# Patient Record
Sex: FEMALE | Race: BLACK OR AFRICAN AMERICAN | Employment: STUDENT | URBAN - METROPOLITAN AREA
[De-identification: names, ages, dates, MRNs, and addresses within clinical notes are randomized per-mention and may not be internally consistent; named-entity substitution may affect disease eponyms.]

---
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
## 2025-06-16 ENCOUNTER — OFFICE VISIT (OUTPATIENT)
Dept: FAMILY MEDICINE CLINIC | Age: 20
End: 2025-06-16
Payer: COMMERCIAL

## 2025-06-16 VITALS
BODY MASS INDEX: 23.32 KG/M2 | DIASTOLIC BLOOD PRESSURE: 72 MMHG | WEIGHT: 140 LBS | SYSTOLIC BLOOD PRESSURE: 110 MMHG | HEIGHT: 65 IN | OXYGEN SATURATION: 98 % | HEART RATE: 63 BPM

## 2025-06-16 DIAGNOSIS — Z11.3 SCREEN FOR STD (SEXUALLY TRANSMITTED DISEASE): ICD-10-CM

## 2025-06-16 DIAGNOSIS — Z00.00 ROUTINE ADULT HEALTH MAINTENANCE: ICD-10-CM

## 2025-06-16 DIAGNOSIS — L20.84 INTRINSIC ECZEMA: Primary | ICD-10-CM

## 2025-06-16 PROCEDURE — 99214 OFFICE O/P EST MOD 30 MIN: CPT | Performed by: STUDENT IN AN ORGANIZED HEALTH CARE EDUCATION/TRAINING PROGRAM

## 2025-06-16 RX ORDER — CLOBETASOL PROPIONATE 0.5 MG/G
OINTMENT TOPICAL
Qty: 15 G | Refills: 1 | Status: SHIPPED | OUTPATIENT
Start: 2025-06-16

## 2025-06-16 SDOH — ECONOMIC STABILITY: FOOD INSECURITY: WITHIN THE PAST 12 MONTHS, THE FOOD YOU BOUGHT JUST DIDN'T LAST AND YOU DIDN'T HAVE MONEY TO GET MORE.: NEVER TRUE

## 2025-06-16 SDOH — ECONOMIC STABILITY: FOOD INSECURITY: WITHIN THE PAST 12 MONTHS, YOU WORRIED THAT YOUR FOOD WOULD RUN OUT BEFORE YOU GOT MONEY TO BUY MORE.: NEVER TRUE

## 2025-06-16 ASSESSMENT — ENCOUNTER SYMPTOMS
CHEST TIGHTNESS: 0
NAUSEA: 0
COUGH: 0
ABDOMINAL PAIN: 0
SHORTNESS OF BREATH: 0
DIARRHEA: 0
WHEEZING: 0
VOMITING: 0
EYE DISCHARGE: 0
CONSTIPATION: 0
SORE THROAT: 0

## 2025-06-16 ASSESSMENT — PATIENT HEALTH QUESTIONNAIRE - PHQ9
SUM OF ALL RESPONSES TO PHQ QUESTIONS 1-9: 0
1. LITTLE INTEREST OR PLEASURE IN DOING THINGS: NOT AT ALL
2. FEELING DOWN, DEPRESSED OR HOPELESS: NOT AT ALL
SUM OF ALL RESPONSES TO PHQ QUESTIONS 1-9: 0

## 2025-06-16 NOTE — PROGRESS NOTES
[facility]  [facility] PRIMARY [address]  [address]  [facility] [place] [address]  Dept: [phone]  Dept Fax: [phone]    Campos Jorgensen is a 19 y.o. female who is a Established patient, who presents today for her medical conditions/complaints as noted below:  Chief Complaint   Patient presents with    allergy testing. Had a reaction to something.    Depression         HPI:     HPI  History of Present Illness  The patient presents for evaluation of a rash.    She reports a known allergy to dust but is uncertain if her current symptoms are due to stress or external heat. She has been experiencing flare-ups on her neck and arm, which she has been attempting to alleviate through scratching. She does not report any food allergies or weather-related triggers but suspects that excessive dairy intake or added sugars may be contributing factors. She also notes that her symptoms seem to worsen with stress, poor diet, and heat. She has been using Silvadene cream for the past 2 days, which has provided some relief. She has exhausted her supply of clobetasol 0.5% cream, which she found beneficial in the past.    She is sexually active and has never undergone a Pap smear. She does not currently have an OB-GYN and is interested in getting a referral.    She experienced a depressive episode 2 days ago following the death of a friend but reports no ongoing feelings of depression or lack of motivation. Her sleep pattern is generally good, although she had difficulty sleeping 2 days ago. She is not currently seeing a therapist and does not feel the need to do so at this time.    She reports no current migraines or feelings of fatigue.    SOCIAL HISTORY  She works at a long-term care facility.        No results found for: \"LABA1C\"          ( goal A1Cis < 7)   No components found for: \"LABMICR\"  No components found for: \"LDLCHOLESTEROL\", \"LDLCALC\"    (goal LDL is <100)   No results found for: \"AST\", \"ALT\", \"BUN\",

## 2025-07-31 ENCOUNTER — OFFICE VISIT (OUTPATIENT)
Age: 20
End: 2025-07-31

## 2025-07-31 VITALS
OXYGEN SATURATION: 98 % | TEMPERATURE: 97.2 F | DIASTOLIC BLOOD PRESSURE: 73 MMHG | BODY MASS INDEX: 23.32 KG/M2 | HEIGHT: 65 IN | SYSTOLIC BLOOD PRESSURE: 110 MMHG | WEIGHT: 140 LBS | RESPIRATION RATE: 20 BRPM | HEART RATE: 81 BPM

## 2025-07-31 DIAGNOSIS — J02.9 ACUTE PHARYNGITIS, UNSPECIFIED ETIOLOGY: Primary | ICD-10-CM

## 2025-07-31 PROBLEM — S06.0X9A CONCUSSION W LOSS OF CONSCIOUSNESS OF UNSP DURATION, INIT: Status: ACTIVE | Noted: 2022-10-19

## 2025-07-31 LAB — S PYO AG THROAT QL: NORMAL

## 2025-07-31 ASSESSMENT — ENCOUNTER SYMPTOMS
COUGH: 0
SORE THROAT: 1
RHINORRHEA: 0
SHORTNESS OF BREATH: 0
NAUSEA: 0

## 2025-07-31 NOTE — PROGRESS NOTES
[facility], [facility] ([place])  [facility] [place] [id number]  Dept: [phone]    Date: 25    Visit date not found      Campos Jorgensen (:  2005) is a 19 y.o. female, here for evaluation of the following chief complaint(s):  Cold Symptoms (Patient said her symptoms started today )      HPI    History provided by:  Patient  Cold Symptoms   This is a new problem. The current episode started today. The problem has been unchanged. There has been no fever. Associated symptoms include ear pain and a sore throat. Pertinent negatives include no chest pain, congestion, coughing, headaches, nausea, rash or rhinorrhea. She has tried nothing for the symptoms.          History reviewed. No pertinent past medical history.      ROS  Review of Systems   Constitutional:  Negative for fatigue and fever.   HENT:  Positive for ear pain and sore throat. Negative for congestion and rhinorrhea.    Respiratory:  Negative for cough and shortness of breath.    Cardiovascular:  Negative for chest pain and palpitations.   Gastrointestinal:  Negative for nausea.   Skin:  Negative for rash.   Neurological:  Positive for dizziness. Negative for headaches.       PHYSICAL EXAM  Vitals:    25 1754   BP: 110/73   BP Site: Right Upper Arm   Patient Position: Sitting   BP Cuff Size: Medium Adult   Pulse: 81   Resp: 20   Temp: 97.2 °F (36.2 °C)   SpO2: 98%   Weight: 63.5 kg (140 lb)   Height: 1.651 m (5' 5\")     Physical Exam  Constitutional:       General: She is not in acute distress.     Appearance: Normal appearance.   HENT:      Head: Normocephalic.      Right Ear: Tympanic membrane normal.      Left Ear: Tympanic membrane normal.      Nose: Nose normal.      Mouth/Throat:      Pharynx: No posterior oropharyngeal erythema.   Eyes:      Extraocular Movements: Extraocular movements intact.   Cardiovascular:      Rate and Rhythm: Normal rate.   Pulmonary:      Effort: Pulmonary

## 2025-09-02 ENCOUNTER — OFFICE VISIT (OUTPATIENT)
Age: 20
End: 2025-09-02

## 2025-09-02 VITALS — OXYGEN SATURATION: 98 % | HEART RATE: 106 BPM | DIASTOLIC BLOOD PRESSURE: 66 MMHG | SYSTOLIC BLOOD PRESSURE: 115 MMHG

## 2025-09-02 DIAGNOSIS — U07.1 COVID: Primary | ICD-10-CM

## 2025-09-02 LAB
Lab: ABNORMAL
QC PASS/FAIL: ABNORMAL
SARS-COV-2, POC: DETECTED

## 2025-09-02 ASSESSMENT — ENCOUNTER SYMPTOMS
EYES NEGATIVE: 1
SINUS PAIN: 1
COUGH: 1
ALLERGIC/IMMUNOLOGIC NEGATIVE: 1